# Patient Record
Sex: FEMALE | Race: WHITE | NOT HISPANIC OR LATINO | Employment: STUDENT | URBAN - METROPOLITAN AREA
[De-identification: names, ages, dates, MRNs, and addresses within clinical notes are randomized per-mention and may not be internally consistent; named-entity substitution may affect disease eponyms.]

---

## 2021-12-02 ENCOUNTER — INFUSION (OUTPATIENT)
Dept: INFUSION THERAPY | Facility: HOSPITAL | Age: 21
End: 2021-12-02
Attending: SPECIALIST
Payer: MEDICAID

## 2021-12-02 VITALS
OXYGEN SATURATION: 100 % | TEMPERATURE: 98 F | SYSTOLIC BLOOD PRESSURE: 112 MMHG | DIASTOLIC BLOOD PRESSURE: 66 MMHG | HEART RATE: 80 BPM | RESPIRATION RATE: 12 BRPM

## 2021-12-02 DIAGNOSIS — A54.9 INFECTION DUE TO NEISSERIA GONORRHOEAE: Primary | ICD-10-CM

## 2021-12-02 PROCEDURE — 25000003 PHARM REV CODE 250: Performed by: SPECIALIST

## 2021-12-02 PROCEDURE — 63600175 PHARM REV CODE 636 W HCPCS: Performed by: SPECIALIST

## 2021-12-02 PROCEDURE — 96372 THER/PROPH/DIAG INJ SC/IM: CPT

## 2021-12-02 RX ADMIN — CEFTRIAXONE SODIUM 250 MG: 250 INJECTION, POWDER, FOR SOLUTION INTRAMUSCULAR; INTRAVENOUS at 10:12

## 2024-09-20 ENCOUNTER — OFFICE VISIT (OUTPATIENT)
Dept: URGENT CARE | Facility: CLINIC | Age: 24
End: 2024-09-20
Payer: MEDICAID

## 2024-09-20 VITALS
BODY MASS INDEX: 21.66 KG/M2 | DIASTOLIC BLOOD PRESSURE: 69 MMHG | OXYGEN SATURATION: 99 % | WEIGHT: 130 LBS | HEIGHT: 65 IN | HEART RATE: 93 BPM | TEMPERATURE: 99 F | SYSTOLIC BLOOD PRESSURE: 114 MMHG | RESPIRATION RATE: 18 BRPM

## 2024-09-20 DIAGNOSIS — Z20.822 ENCOUNTER FOR LABORATORY TESTING FOR COVID-19 VIRUS: Primary | ICD-10-CM

## 2024-09-20 LAB
CTP QC/QA: YES
SARS-COV-2 AG RESP QL IA.RAPID: NEGATIVE

## 2024-09-20 NOTE — LETTER
September 20, 2024      [facility]  [address]  [facility] [address]  Phone: [phone]       Patient: Katie Boyer   YOB: 2000  Date of Visit: 09/20/2024    To Whom It May Concern:    Chon Boyer  was at [facility] on 09/20/2024. The patient may return to work/school on September 23, 2024 with no restrictions. If you have any questions or concerns, or if I can be of further assistance, please do not hesitate to contact me.    Sincerely,    Carlos Aviles Jr., FNP-C

## 2024-09-21 NOTE — PROGRESS NOTES
"Subjective:      Patient ID: Katie Boyer is a 24 y.o. female.    Vitals:  height is 5' 5" (1.651 m) and weight is 59 kg (130 lb). Her oral temperature is 98.6 °F (37 °C). Her blood pressure is 114/69 and her pulse is 93. Her respiration is 18 and oxygen saturation is 99%.     Chief Complaint: Sore Throat    24-year-old female seen today requesting COVID testing.  She reports vague symptoms over the last 2 weeks that have all resolved.  No symptoms today.  Denies any recent fever.    Sore Throat   This is a new problem. The current episode started in the past 7 days. Pertinent negatives include no congestion, coughing, shortness of breath or vomiting.       Constitution: Negative for chills and fever.   HENT:  Negative for congestion and sore throat.    Cardiovascular:  Negative for chest pain, palpitations and sob on exertion.   Respiratory:  Negative for cough and shortness of breath.    Gastrointestinal:  Negative for nausea and vomiting.   Skin:  Negative for rash.   Neurological:  Negative for dizziness, passing out, disorientation and altered mental status.   Psychiatric/Behavioral:  Negative for altered mental status, disorientation and confusion.       Objective:     Physical Exam   Constitutional: She is oriented to person, place, and time. She appears well-developed. She is cooperative.  Non-toxic appearance. She does not appear ill. No distress.   HENT:   Head: Normocephalic and atraumatic.   Ears:   Right Ear: Hearing and external ear normal.   Left Ear: Hearing and external ear normal.   Nose: Nose normal. No mucosal edema, rhinorrhea, nasal deformity or congestion. No epistaxis. Right sinus exhibits no maxillary sinus tenderness and no frontal sinus tenderness. Left sinus exhibits no maxillary sinus tenderness and no frontal sinus tenderness.   Mouth/Throat: Uvula is midline, oropharynx is clear and moist and mucous membranes are normal. Mucous membranes are moist. No trismus in the jaw. Normal " dentition. No uvula swelling. No oropharyngeal exudate, posterior oropharyngeal edema, posterior oropharyngeal erythema, tonsillar abscesses or cobblestoning. Tonsils are 1+ on the right. Tonsils are 1+ on the left. No tonsillar exudate.   Eyes: Conjunctivae and lids are normal. No scleral icterus.   Neck: Trachea normal and phonation normal. Neck supple. No edema present. No erythema present. No neck rigidity present.   Cardiovascular: Normal rate, regular rhythm, normal heart sounds and normal pulses.   Pulmonary/Chest: Effort normal and breath sounds normal. No stridor. No respiratory distress. She has no decreased breath sounds. She has no wheezes. She has no rhonchi.   Abdominal: Normal appearance.   Musculoskeletal: Normal range of motion.         General: No deformity. Normal range of motion.   Lymphadenopathy:     She has no cervical adenopathy.   Neurological: no focal deficit. She is alert and oriented to person, place, and time. She exhibits normal muscle tone. Coordination normal.   Skin: Skin is warm, dry, intact, not diaphoretic and not pale. Capillary refill takes 2 to 3 seconds.   Psychiatric: Her speech is normal and behavior is normal. Judgment and thought content normal.   Nursing note and vitals reviewed.      Assessment:     1. Encounter for laboratory testing for COVID-19 virus        Plan:       Encounter for laboratory testing for COVID-19 virus  -     SARS Coronavirus 2 Antigen, POCT Manual Read      I have discussed the test results and physical exam findings with the patient. We discussed the need to continue to monitor for symptoms and return to clinic or follow up for the development of any new symptoms. She verbalized understanding and agreement.

## 2024-10-15 ENCOUNTER — OFFICE VISIT (OUTPATIENT)
Dept: URGENT CARE | Facility: CLINIC | Age: 24
End: 2024-10-15
Payer: MEDICAID

## 2024-10-15 VITALS
WEIGHT: 130 LBS | RESPIRATION RATE: 19 BRPM | HEIGHT: 67 IN | SYSTOLIC BLOOD PRESSURE: 131 MMHG | BODY MASS INDEX: 20.4 KG/M2 | DIASTOLIC BLOOD PRESSURE: 88 MMHG | OXYGEN SATURATION: 99 % | HEART RATE: 97 BPM | TEMPERATURE: 97 F

## 2024-10-15 DIAGNOSIS — M25.561 ACUTE PAIN OF RIGHT KNEE: Primary | ICD-10-CM

## 2024-10-15 PROCEDURE — 99213 OFFICE O/P EST LOW 20 MIN: CPT | Mod: S$GLB,,, | Performed by: NURSE PRACTITIONER

## 2024-10-15 NOTE — LETTER
October 15, 2024      [facility]  [address]  [facility] [address]  Phone: [phone]       Patient: Katie Boyer   YOB: 2000  Date of Visit: 10/15/2024    To Whom It May Concern:    Chon Boyer  was at [facility] on 10/15/2024. The patient may return to work/school on 10/17/2024 with no restrictions. If you have any questions or concerns, or if I can be of further assistance, please do not hesitate to contact me.    Sincerely,    Meagan Lemos, CHRISTIE-C

## 2024-10-15 NOTE — PATIENT INSTRUCTIONS
General Discharge Instructions   If you were prescribed a narcotic or controlled medication, do not drive or operate heavy equipment or machinery while taking these medications.  If you were prescribed antibiotics, please take them to completion.  You must understand that you've received an Urgent Care treatment only and that you may be released before all your medical problems are known or treated. You, the patient, will arrange for follow up care as instructed.  Follow up with your PCP or specialty clinic as directed in the next 1-2 weeks if not improved or as needed.  You can call [phone] to schedule an appointment with the appropriate provider.  If your condition worsens we recommend that you receive another evaluation at the emergency room immediately or contact your primary medical clinics after hours call service to discuss your concerns.  Please return here or go to the Emergency Department for any concerns or worsening of condition.      WE CANNOT RULE OUT ALL POSSIBLE CAUSES OF YOUR SYMPTOMS IN THE URGENT CARE SETTING PLEASE GO TO THE ER IF YOU FEELS YOUR CONDITION IS WORSENING OR YOU WOULD LIKE EMERGENT EVALUATION.

## 2024-10-15 NOTE — PROGRESS NOTES
"Subjective:      Patient ID: Ktaie Boyer is a 24 y.o. female.    Vitals:  height is 5' 7" (1.702 m) and weight is 59 kg (130 lb). Her oral temperature is 97.4 °F (36.3 °C). Her blood pressure is 131/88 and her pulse is 97. Her respiration is 19 and oxygen saturation is 99%.     Chief Complaint: Knee Injury    Pt presents to clinic with c/o of R knee pain x2 days. Pt reports stomping foot and is now having pain. Pt denies taking ibuprofen for pain w/o relief. Declined xray     Knee Injury  This is a new problem. The current episode started in the past 7 days (x2 days ago). The symptoms are aggravated by walking, bending, exertion and twisting. She has tried NSAIDs and ice (ibuprofen) for the symptoms. The treatment provided no relief.       Musculoskeletal:  Positive for pain.          Objective:     Physical Exam   Constitutional:  Non-toxic appearance. She does not appear ill. No distress.   HENT:   Head: Normocephalic and atraumatic.   Ears:   Right Ear: External ear normal.   Left Ear: External ear normal.   Pulmonary/Chest: Effort normal. No respiratory distress.   Abdominal: Normal appearance.   Musculoskeletal:      Right knee: Tenderness found.      Left knee: Normal.        Legs:    Neurological: She is alert.   Skin: Skin is warm and not diaphoretic.   Nursing note and vitals reviewed.    Assessment:     1. Acute pain of right knee        Plan:   Declined xray  Continue Ibuprofen  RICE     Acute pain of right knee                Patient Instructions   General Discharge Instructions   If you were prescribed a narcotic or controlled medication, do not drive or operate heavy equipment or machinery while taking these medications.  If you were prescribed antibiotics, please take them to completion.  You must understand that you've received an Urgent Care treatment only and that you may be released before all your medical problems are known or treated. You, the patient, will arrange for follow up care as " instructed.  Follow up with your PCP or specialty clinic as directed in the next 1-2 weeks if not improved or as needed.  You can call [phone] to schedule an appointment with the appropriate provider.  If your condition worsens we recommend that you receive another evaluation at the emergency room immediately or contact your primary medical clinics after hours call service to discuss your concerns.  Please return here or go to the Emergency Department for any concerns or worsening of condition.      WE CANNOT RULE OUT ALL POSSIBLE CAUSES OF YOUR SYMPTOMS IN THE URGENT CARE SETTING PLEASE GO TO THE ER IF YOU FEELS YOUR CONDITION IS WORSENING OR YOU WOULD LIKE EMERGENT EVALUATION.

## 2024-12-06 ENCOUNTER — OFFICE VISIT (OUTPATIENT)
Dept: URGENT CARE | Facility: CLINIC | Age: 24
End: 2024-12-06
Payer: MEDICAID

## 2024-12-06 VITALS
BODY MASS INDEX: 20.25 KG/M2 | RESPIRATION RATE: 16 BRPM | TEMPERATURE: 99 F | HEART RATE: 84 BPM | OXYGEN SATURATION: 99 % | HEIGHT: 67 IN | SYSTOLIC BLOOD PRESSURE: 122 MMHG | DIASTOLIC BLOOD PRESSURE: 79 MMHG | WEIGHT: 129 LBS

## 2024-12-06 DIAGNOSIS — R11.10 VOMITING, UNSPECIFIED VOMITING TYPE, UNSPECIFIED WHETHER NAUSEA PRESENT: Primary | ICD-10-CM

## 2024-12-06 DIAGNOSIS — K52.9 GASTROENTERITIS: ICD-10-CM

## 2024-12-06 NOTE — PROGRESS NOTES
Subjective:      Patient ID: Katie Boyer is a 24 y.o. female.    Vitals:  vitals were not taken for this visit.     Chief Complaint: Emesis    Patient is a 24-year-old female presenting for nausea and vomiting.  Patient states she had 2 episodes of nausea and vomiting and symptoms have since subsided.  Patient states her work will not let her return without a note.  Patient denies any complaints today.    Emesis   This is a new problem. The current episode started yesterday. The problem occurs less than 2 times per day. The problem has been unchanged. The emesis has an appearance of stomach contents. There has been no fever. Associated symptoms include diarrhea and headaches. She has tried nothing for the symptoms. The treatment provided no relief.       Gastrointestinal:  Positive for vomiting and diarrhea.   Neurological:  Positive for headaches.    Objective:     Physical Exam   Constitutional: She is oriented to person, place, and time. She appears well-developed.   HENT:   Head: Normocephalic and atraumatic.   Ears:   Right Ear: External ear normal.   Left Ear: External ear normal.   Nose: Nose normal.   Mouth/Throat: Mucous membranes are normal.   Eyes: Conjunctivae and lids are normal.   Neck: Trachea normal. Neck supple.   Cardiovascular: Normal rate, regular rhythm and normal heart sounds.   Pulmonary/Chest: Effort normal and breath sounds normal. No respiratory distress.   Abdominal: Normal appearance and bowel sounds are normal. She exhibits no distension and no mass. Soft. flat abdomen There is no abdominal tenderness.   Musculoskeletal: Normal range of motion.         General: Normal range of motion.   Neurological: She is alert and oriented to person, place, and time. She has normal strength.   Skin: Skin is warm, dry, intact, not diaphoretic and not pale.   Psychiatric: Her speech is normal and behavior is normal. Judgment and thought content normal.   Nursing note and vitals  reviewed.      Assessment:     No diagnosis found.    Plan:       There are no diagnoses linked to this encounter.      Medical Decision Making:   Initial Assessment:   Evaluation of a 23 yo female presenting for 2 episodes of nausea and vomiting.  Patient states she had 1 episode last night and when this morning and her work would not let her go.  Patient states she was told by her manager that she needed a note to return.  Patient denies any complaints at this time.  On exam pt is A&Ox3. VSS. Nonfebrile and nontoxic appearing.  Mucous membranes pink and moist. Pt speaking in full sentences.  Steady gait appreciated. Cap refill < 3 seconds.  Patient denies any complaints.    Differential Diagnosis:   Viral gastritis, gastroenteritis, electrolyte abnormality, others  Urgent Care Management:  Patient advised that we will give her a note to return to work.  Offered patient Zofran but she states she has it at home but just did not take it.  Patient advised to increase fluids bland diet.  Follow up as needed.  Return precautions discussed.  Patient verbalized understanding.  All questions and concerns addressed.

## 2024-12-06 NOTE — LETTER
December 6, 2024      [facility]  [address]  [facility] [address]  Phone: [phone]       Patient: aKtie Boyer   YOB: 2000  Date of Visit: 12/06/2024    To Whom It May Concern:    Chon Boyer  was at [facility] on 12/06/2024. The patient may return to work/school on 12/7/24 with no restrictions. If you have any questions or concerns, or if I can be of further assistance, please do not hesitate to contact me.    Sincerely,        Beth Arambula, NP

## 2024-12-07 NOTE — PATIENT INSTRUCTIONS
You were seen and evaluated at urgent care today.  Your symptoms have completely resolved.  Please take OTC medications as needed.  Please follow-up with your PCP as needed.  Please proceed to the ED for any worsening symptoms such as chest pain, shortness of breath, fever not controlled with Tylenol or ibuprofen or uncontrolled pain.      Our goal at [facility] is to always give you outstanding care and exceptional service. You may receive a survey by mail or e-mail in the next week regarding your experience in our ED. We would greatly appreciate your completing and returning the survey. Your feedback provides us with a way to recognize our staff who give very good care and it helps us learn how to improve when your experience was below our aspiration of excellence.

## 2025-05-03 ENCOUNTER — OFFICE VISIT (OUTPATIENT)
Dept: URGENT CARE | Facility: CLINIC | Age: 25
End: 2025-05-03
Payer: MEDICAID

## 2025-05-03 VITALS
BODY MASS INDEX: 21.19 KG/M2 | SYSTOLIC BLOOD PRESSURE: 127 MMHG | HEART RATE: 95 BPM | DIASTOLIC BLOOD PRESSURE: 91 MMHG | RESPIRATION RATE: 16 BRPM | TEMPERATURE: 98 F | HEIGHT: 67 IN | WEIGHT: 135 LBS | OXYGEN SATURATION: 99 %

## 2025-05-03 DIAGNOSIS — J06.9 VIRAL URI WITH COUGH: ICD-10-CM

## 2025-05-03 DIAGNOSIS — J02.9 SORE THROAT: Primary | ICD-10-CM

## 2025-05-03 LAB
CTP QC/QA: YES
FLUAV AG NPH QL: NEGATIVE
FLUBV AG NPH QL: NEGATIVE
S PYO RRNA THROAT QL PROBE: NEGATIVE
SARS CORONAVIRUS 2 ANTIGEN: NEGATIVE

## 2025-05-03 PROCEDURE — 87811 SARS-COV-2 COVID19 W/OPTIC: CPT | Mod: QW,S$GLB,, | Performed by: NURSE PRACTITIONER

## 2025-05-03 PROCEDURE — 87804 INFLUENZA ASSAY W/OPTIC: CPT | Mod: QW,,, | Performed by: NURSE PRACTITIONER

## 2025-05-03 PROCEDURE — 99214 OFFICE O/P EST MOD 30 MIN: CPT | Mod: S$GLB,,, | Performed by: NURSE PRACTITIONER

## 2025-05-03 PROCEDURE — 87880 STREP A ASSAY W/OPTIC: CPT | Mod: QW,,, | Performed by: NURSE PRACTITIONER

## 2025-05-03 RX ORDER — BENZONATATE 100 MG/1
100 CAPSULE ORAL 3 TIMES DAILY PRN
Qty: 30 CAPSULE | Refills: 0 | Status: SHIPPED | OUTPATIENT
Start: 2025-05-03 | End: 2025-05-13

## 2025-05-03 RX ORDER — PROMETHAZINE HYDROCHLORIDE AND DEXTROMETHORPHAN HYDROBROMIDE 6.25; 15 MG/5ML; MG/5ML
5 SYRUP ORAL NIGHTLY PRN
Qty: 118 ML | Refills: 0 | Status: SHIPPED | OUTPATIENT
Start: 2025-05-03

## 2025-05-03 RX ORDER — CETIRIZINE HYDROCHLORIDE 10 MG/1
10 TABLET ORAL DAILY
Qty: 30 TABLET | Refills: 0 | Status: SHIPPED | OUTPATIENT
Start: 2025-05-03 | End: 2025-06-02

## 2025-05-03 RX ORDER — AZELASTINE 1 MG/ML
1 SPRAY, METERED NASAL 2 TIMES DAILY
Qty: 30 ML | Refills: 0 | Status: SHIPPED | OUTPATIENT
Start: 2025-05-03

## 2025-05-03 RX ORDER — FLUTICASONE PROPIONATE 50 MCG
1 SPRAY, SUSPENSION (ML) NASAL DAILY
Qty: 15.8 ML | Refills: 0 | Status: SHIPPED | OUTPATIENT
Start: 2025-05-03

## 2025-05-03 NOTE — PROGRESS NOTES
"Subjective:      Patient ID: Katie Boyer is a 24 y.o. female.    Vitals:  height is 5' 7" (1.702 m) and weight is 61.2 kg (135 lb). Her temperature is 98 °F (36.7 °C). Her blood pressure is 127/91 (abnormal) and her pulse is 95. Her respiration is 16 and oxygen saturation is 99%.     Chief Complaint: Cough    Onset of symptoms 2 days, no sick contacts    Cough  This is a new problem. Episode onset: 5/1. The problem has been gradually worsening. The cough is Non-productive. Associated symptoms include a fever, nasal congestion, postnasal drip and a sore throat. Pertinent negatives include no headaches, rash, shortness of breath or wheezing.       Constitution: Positive for fatigue and fever.   HENT:  Positive for congestion, postnasal drip, sore throat and voice change. Negative for trouble swallowing.    Respiratory:  Positive for cough. Negative for chest tightness, shortness of breath, wheezing and asthma.    Gastrointestinal:  Negative for vomiting and diarrhea.   Skin:  Negative for rash.   Allergic/Immunologic: Negative for asthma.   Neurological:  Negative for headaches.      Objective:     Physical Exam   Constitutional: She is oriented to person, place, and time. She is cooperative.  Non-toxic appearance. She does not appear ill. No distress. awake  HENT:   Head: Normocephalic and atraumatic.   Ears:   Right Ear: Tympanic membrane, external ear and ear canal normal.   Left Ear: Tympanic membrane, external ear and ear canal normal.   Nose: Rhinorrhea and congestion present.   Mouth/Throat: Mucous membranes are moist. Posterior oropharyngeal erythema present. No oropharyngeal exudate.   Eyes: Conjunctivae are normal. Right eye exhibits no discharge. Left eye exhibits no discharge.   Cardiovascular: Normal rate, regular rhythm and normal heart sounds.   Pulmonary/Chest: Effort normal and breath sounds normal. No accessory muscle usage. No tachypnea. No respiratory distress. She has no wheezes. She has no " rhonchi. She has no rales. She exhibits no tenderness.   Abdominal: Normal appearance.   Neurological: no focal deficit. She is alert and oriented to person, place, and time. No sensory deficit.   Skin: Skin is warm, dry, not diaphoretic and no rash. Capillary refill takes 2 to 3 seconds.   Psychiatric: Her behavior is normal. Mood normal.   Nursing note and vitals reviewed.      Assessment:     1. Sore throat    2. Viral URI with cough      COVID negative  Flu a/B negative  Strep A negative    Plan:       Sore throat  -     SARS Coronavirus 2 Antigen, POCT Manual Read  -     POCT rapid strep A  -     POCT Influenza A/B Rapid Antigen    Viral URI with cough  -     azelastine (ASTELIN) 137 mcg (0.1 %) nasal spray; 1 spray (137 mcg total) by Nasal route 2 (two) times daily.  Dispense: 30 mL; Refill: 0  -     fluticasone propionate (FLONASE) 50 mcg/actuation nasal spray; 1 spray (50 mcg total) by Each Nostril route once daily.  Dispense: 15.8 mL; Refill: 0  -     cetirizine (ZYRTEC) 10 MG tablet; Take 1 tablet (10 mg total) by mouth once daily.  Dispense: 30 tablet; Refill: 0  -     benzonatate (TESSALON) 100 MG capsule; Take 1 capsule (100 mg total) by mouth 3 (three) times daily as needed for Cough.  Dispense: 30 capsule; Refill: 0  -     promethazine-dextromethorphan (PROMETHAZINE-DM) 6.25-15 mg/5 mL Syrp; Take 5 mLs by mouth nightly as needed (cough).  Dispense: 118 mL; Refill: 0

## 2025-05-03 NOTE — LETTER
May 3, 2025      [facility]  [address]  [facility] [address]  Phone: [phone]       Patient: Katie Boyer   YOB: 2000  Date of Visit: 05/03/2025    To Whom It May Concern:    Chon Boyer  was at [facility] on 05/03/2025. The patient may return to work/school on 05/05/2025  with no restrictions.  Please also excuse 05/02/2025 due to current illness.  If you have any questions or concerns, or if I can be of further assistance, please do not hesitate to contact me.    Sincerely,    Evie Flores, NP

## 2025-05-03 NOTE — PATIENT INSTRUCTIONS
If mucus/sputum is thick and hard to expectorate, use Guaifenesin 600-1200 mg twice a day over-the-counter for 10 days.  Mucinex blue box or generic equivalent  If mucus if thin with excessive clear, runny nose, you can try mucinex- D which must be purchased from behind the pharmacy counter as take as prescribed on the package. Do not use longer than 4-5 days.  You can also utilize over the counter Afrin or generic equivalent nasal spray to open nasal passages for breathing, however do not use longer than 3-4 days.    The following allergy medications are recommended, not required:  However they do help minimize symptoms now but will reduce lingering symptoms such as plugged/congested ears.   Zyrtec or antihistamine of choice over-the-counter daily until symptoms have resolved  Flonase or nasal steroid of choice over-the-counter daily until symptoms have resolved  Astelin nasal antihistamine as prescribed for faster relief of nasal/sinus inflammation.  If insurance does not cover, you can purchase over the counter as Astepro much cheaper.    The following cough medications have been prescribed to you, however most insurances do not cover them so be certain to price check these items before they ring you up as you may find something off the shelf or already at home that may be sufficient.    Tessalon Perles cough pills that help with cough caused by the postnasal drip, throat irritation.  Best for use during daytime hours   Phenergan cough syrup at night only to suppress cough so that you are able to rest.  You can take this together with Tessalon Perles for added benefit      If you have been told this is a viral illness, you may require re-evaluation if symptoms improve but then worsen soon after or you have note changes in characteristic of mucus/sputum developing  into thick cloudy (not clear), colored change or blood-tinged.    ER for any significant worsening of respiratory status such as difficulty  breathing, shortness of breath not relieved by rest, air hunger, excessive fatigue/lethargy, upper airway swelling making it hard to swallow.

## 2025-05-03 NOTE — LETTER
May 3, 2025      [facility]  [address]  [facility] [address]  Phone: [phone]       Patient: Katie Boyer   YOB: 2000  Date of Visit: 05/03/2025    To Whom It May Concern:    Chon Boyer  was at [facility] on 05/03/2025. The patient may return to work/school on 05/05/2025  with no restrictions. If you have any questions or concerns, or if I can be of further assistance, please do not hesitate to contact me.    Sincerely,    Evie Flores, NP